# Patient Record
Sex: FEMALE | Race: WHITE | NOT HISPANIC OR LATINO | Employment: FULL TIME | URBAN - METROPOLITAN AREA
[De-identification: names, ages, dates, MRNs, and addresses within clinical notes are randomized per-mention and may not be internally consistent; named-entity substitution may affect disease eponyms.]

---

## 2017-06-19 ENCOUNTER — COMMUNICATION - HEALTHEAST (OUTPATIENT)
Dept: FAMILY MEDICINE | Facility: CLINIC | Age: 37
End: 2017-06-19

## 2017-06-25 ENCOUNTER — OFFICE VISIT (OUTPATIENT)
Dept: URGENT CARE | Facility: URGENT CARE | Age: 37
End: 2017-06-25
Payer: COMMERCIAL

## 2017-06-25 VITALS
OXYGEN SATURATION: 98 % | HEART RATE: 61 BPM | WEIGHT: 160 LBS | HEIGHT: 72 IN | SYSTOLIC BLOOD PRESSURE: 92 MMHG | BODY MASS INDEX: 21.67 KG/M2 | TEMPERATURE: 98.4 F | DIASTOLIC BLOOD PRESSURE: 56 MMHG

## 2017-06-25 DIAGNOSIS — S23.41XA RIB SPRAIN, INITIAL ENCOUNTER: Primary | ICD-10-CM

## 2017-06-25 PROCEDURE — 99213 OFFICE O/P EST LOW 20 MIN: CPT | Performed by: FAMILY MEDICINE

## 2017-06-25 RX ORDER — HYDROCODONE BITARTRATE AND ACETAMINOPHEN 5; 325 MG/1; MG/1
1 TABLET ORAL EVERY 4 HOURS PRN
Qty: 15 TABLET | Refills: 0 | Status: SHIPPED | OUTPATIENT
Start: 2017-06-25 | End: 2022-03-30

## 2017-06-25 NOTE — NURSING NOTE
Chief Complaint   Patient presents with     Urgent Care     Back Pain     was dancing this morning and got dizzy, ended up falling and hitting pew. Back pain mid right side.        Initial BP 92/56  Pulse 61  Temp 98.4  F (36.9  C) (Oral)  Ht 6' (1.829 m)  Wt 160 lb (72.6 kg)  SpO2 98%  Breastfeeding? Yes  BMI 21.7 kg/m2 Estimated body mass index is 21.7 kg/(m^2) as calculated from the following:    Height as of this encounter: 6' (1.829 m).    Weight as of this encounter: 160 lb (72.6 kg).  Medication Reconciliation: complete

## 2017-06-25 NOTE — MR AVS SNAPSHOT
"              After Visit Summary   2017    Shirley Carmona    MRN: [id number]           Patient Information     Date Of Birth          1980        Visit Information        Provider Department      2017 2:10 PM Chato Noel MD [facility]        Today's Diagnoses     Rib sprain, initial encounter    -  1       Follow-ups after your visit        Who to contact     If you have questions or need follow up information about today's clinic visit or your schedule please contact [facility] directly at [phone].  Normal or non-critical lab and imaging results will be communicated to you by Weekdonehart, letter or phone within 4 business days after the clinic has received the results. If you do not hear from us within 7 days, please contact the clinic through JustParkt or phone. If you have a critical or abnormal lab result, we will notify you by phone as soon as possible.  Submit refill requests through LABOMAR or call your pharmacy and they will forward the refill request to us. Please allow 3 business days for your refill to be completed.          Additional Information About Your Visit        MyChart Information     LABOMAR lets you send messages to your doctor, view your test results, renew your prescriptions, schedule appointments and more. To sign up, go to [email or web address] . Click on \"Log in\" on the left side of the screen, which will take you to the Welcome page. Then click on \"Sign up Now\" on the right side of the page.     You will be asked to enter the access code listed below, as well as some personal information. Please follow the directions to create your username and password.     Your access code is: [email or web address]  Expires: 2017  3:13 PM     Your access code will  in 90 days. If you need help or a new code, please call your [place] clinic or [phone].        Care EveryWhere ID     This is your Care EveryWhere ID. " This could be used by other organizations to access your [facility] medical records  [id number]        Your Vitals Were     Pulse Temperature Height Pulse Oximetry Breastfeeding? BMI (Body Mass Index)    61 98.4  F (36.9  C) (Oral) 6' (1.829 m) 98% Yes 21.7 kg/m2       Blood Pressure from Last 3 Encounters:   06/25/17 92/56    Weight from Last 3 Encounters:   06/25/17 160 lb (72.6 kg)              Today, you had the following     No orders found for display         Today's Medication Changes          These changes are accurate as of: 6/25/17  3:13 PM.  If you have any questions, ask your nurse or doctor.               Start taking these medicines.        Dose/Directions    HYDROcodone-acetaminophen 5-325 MG per tablet   Commonly known as:  NORCO   Used for:  Rib sprain, initial encounter   Started by:  Chato Noel MD        Dose:  1 tablet   Take 1 tablet by mouth every 4 hours as needed for pain maximum 2 tablet(s) per day   Quantity:  15 tablet   Refills:  0            Where to get your medicines      Some of these will need a paper prescription and others can be bought over the counter.  Ask your nurse if you have questions.     Bring a paper prescription for each of these medications     HYDROcodone-acetaminophen 5-325 MG per tablet                Primary Care Provider Office Phone # Fax #    Haylee Charlton [phone]       [address] [address]        Equal Access to Services     CARMEN WALKER AH: Hadclaudine mauricio Somitzi, waaxda luqadaha, qaybta kaalmada enedelia, bhavana franks. So [facility] [phone].    ATENCIÓN: Si habla español, tiene a flynn disposición servicios gratuitos de asistencia lingüística. Graeme al [phone].    We comply with applicable federal civil rights laws and [place] laws. We do not discriminate on the basis of race, color, national origin, age, disability sex, sexual orientation or gender identity.             Thank you!     Thank you for choosing [facility]  for your care. Our goal is always to provide you with excellent care. Hearing back from our patients is one way we can continue to improve our services. Please take a few minutes to complete the written survey that you may receive in the mail after your visit with us. Thank you!             Your Updated Medication List - Protect others around you: Learn how to safely use, store and throw away your medicines at www.disposemymeds.org.          This list is accurate as of: 6/25/17  3:13 PM.  Always use your most recent med list.                   Brand Name Dispense Instructions for use Diagnosis    HYDROcodone-acetaminophen 5-325 MG per tablet    NORCO    15 tablet    Take 1 tablet by mouth every 4 hours as needed for pain maximum 2 tablet(s) per day    Rib sprain, initial encounter

## 2017-06-25 NOTE — PROGRESS NOTES
Subjective: A few hours ago patient was dancing, whirled around, suddenly felt very dizzy and fell backwards into a pew, hitting her right middle back area on the seat area. It hurt right away, hasn't taken anything for it yet, is nursing, participated deep breath then moved in certain directions. She has urinated since this happened and it looked normal.    Objective: There is painful posterior thoracic area to palpation, no step-offs are felt in the bones which are easily palpable, lungs are clear, abdomen benign. It's difficult for her to lie down and sit up.    Assessment and plan: Right lateral rib sprain versus fracture, x-ray wouldn't be helpful. This will simply take time. Tylenol and ibuprofen are fine to take when nursing. At nighttime if she can't get comfortable she could take Vicodin and I gave her a prescription for 15 pills. She understands that it would pass through the breast milk and that she should not use Vicodin when caring for her infant.

## 2017-08-02 ENCOUNTER — OFFICE VISIT - HEALTHEAST (OUTPATIENT)
Dept: FAMILY MEDICINE | Facility: CLINIC | Age: 37
End: 2017-08-02

## 2017-08-02 DIAGNOSIS — A60.00 GENITAL HERPES SIMPLEX, UNSPECIFIED SITE: ICD-10-CM

## 2017-08-02 DIAGNOSIS — N91.2 AMENORRHEA: ICD-10-CM

## 2017-08-02 DIAGNOSIS — Z00.00 ROUTINE GENERAL MEDICAL EXAMINATION AT A HEALTH CARE FACILITY: ICD-10-CM

## 2017-08-02 DIAGNOSIS — Z98.891 HISTORY OF LOW TRANSVERSE CESAREAN SECTION: ICD-10-CM

## 2017-08-02 ASSESSMENT — MIFFLIN-ST. JEOR: SCORE: 1504.36

## 2017-08-08 LAB
HPV INTERPRETATION - HISTORICAL: NORMAL
HPV INTERPRETER - HISTORICAL: NORMAL

## 2017-08-11 LAB

## 2017-10-07 ENCOUNTER — COMMUNICATION - HEALTHEAST (OUTPATIENT)
Dept: SCHEDULING | Facility: CLINIC | Age: 37
End: 2017-10-07

## 2018-08-20 ENCOUNTER — COMMUNICATION - HEALTHEAST (OUTPATIENT)
Dept: FAMILY MEDICINE | Facility: CLINIC | Age: 38
End: 2018-08-20

## 2018-08-22 ENCOUNTER — OFFICE VISIT - HEALTHEAST (OUTPATIENT)
Dept: FAMILY MEDICINE | Facility: CLINIC | Age: 38
End: 2018-08-22

## 2018-08-22 DIAGNOSIS — R55 PRE-SYNCOPE: ICD-10-CM

## 2018-08-22 DIAGNOSIS — R19.7 DIARRHEA OF PRESUMED INFECTIOUS ORIGIN: ICD-10-CM

## 2018-08-22 LAB
ANION GAP SERPL CALCULATED.3IONS-SCNC: 8 MMOL/L (ref 5–18)
BASOPHILS # BLD AUTO: 0 THOU/UL (ref 0–0.2)
BASOPHILS NFR BLD AUTO: 1 % (ref 0–2)
BUN SERPL-MCNC: 8 MG/DL (ref 8–22)
CALCIUM SERPL-MCNC: 9.3 MG/DL (ref 8.5–10.5)
CHLORIDE BLD-SCNC: 107 MMOL/L (ref 98–107)
CO2 SERPL-SCNC: 26 MMOL/L (ref 22–31)
CREAT SERPL-MCNC: 0.71 MG/DL (ref 0.6–1.1)
EOSINOPHIL # BLD AUTO: 0.1 THOU/UL (ref 0–0.4)
EOSINOPHIL NFR BLD AUTO: 3 % (ref 0–6)
ERYTHROCYTE [DISTWIDTH] IN BLOOD BY AUTOMATED COUNT: 11.9 % (ref 11–14.5)
GFR SERPL CREATININE-BSD FRML MDRD: >60 ML/MIN/1.73M2
GLUCOSE BLD-MCNC: 100 MG/DL (ref 70–125)
HCT VFR BLD AUTO: 38.7 % (ref 35–47)
HGB BLD-MCNC: 13.1 G/DL (ref 12–16)
LYMPHOCYTES # BLD AUTO: 0.9 THOU/UL (ref 0.8–4.4)
LYMPHOCYTES NFR BLD AUTO: 26 % (ref 20–40)
MCH RBC QN AUTO: 31.2 PG (ref 27–34)
MCHC RBC AUTO-ENTMCNC: 33.8 G/DL (ref 32–36)
MCV RBC AUTO: 92 FL (ref 80–100)
MONOCYTES # BLD AUTO: 0.4 THOU/UL (ref 0–0.9)
MONOCYTES NFR BLD AUTO: 11 % (ref 2–10)
NEUTROPHILS # BLD AUTO: 2 THOU/UL (ref 2–7.7)
NEUTROPHILS NFR BLD AUTO: 59 % (ref 50–70)
PLATELET # BLD AUTO: 224 THOU/UL (ref 140–440)
PMV BLD AUTO: 8.2 FL (ref 7–10)
POTASSIUM BLD-SCNC: 4 MMOL/L (ref 3.5–5)
RBC # BLD AUTO: 4.2 MILL/UL (ref 3.8–5.4)
SODIUM SERPL-SCNC: 141 MMOL/L (ref 136–145)
TSH SERPL DL<=0.005 MIU/L-ACNC: 1.13 UIU/ML (ref 0.3–5)
WBC: 3.5 THOU/UL (ref 4–11)

## 2018-08-23 ENCOUNTER — COMMUNICATION - HEALTHEAST (OUTPATIENT)
Dept: FAMILY MEDICINE | Facility: CLINIC | Age: 38
End: 2018-08-23

## 2018-10-14 ENCOUNTER — COMMUNICATION - HEALTHEAST (OUTPATIENT)
Dept: FAMILY MEDICINE | Facility: CLINIC | Age: 38
End: 2018-10-14

## 2018-10-22 ENCOUNTER — COMMUNICATION - HEALTHEAST (OUTPATIENT)
Dept: FAMILY MEDICINE | Facility: CLINIC | Age: 38
End: 2018-10-22

## 2018-10-29 ENCOUNTER — COMMUNICATION - HEALTHEAST (OUTPATIENT)
Dept: FAMILY MEDICINE | Facility: CLINIC | Age: 38
End: 2018-10-29

## 2018-10-29 DIAGNOSIS — A60.04 HERPES SIMPLEX VULVOVAGINITIS: ICD-10-CM

## 2018-11-01 ENCOUNTER — OFFICE VISIT - HEALTHEAST (OUTPATIENT)
Dept: FAMILY MEDICINE | Facility: CLINIC | Age: 38
End: 2018-11-01

## 2018-11-01 DIAGNOSIS — Z00.00 ROUTINE GENERAL MEDICAL EXAMINATION AT A HEALTH CARE FACILITY: ICD-10-CM

## 2018-11-01 ASSESSMENT — MIFFLIN-ST. JEOR: SCORE: 1461.83

## 2018-11-02 LAB
HPV SOURCE: NORMAL
HUMAN PAPILLOMA VIRUS 16 DNA: NEGATIVE
HUMAN PAPILLOMA VIRUS 18 DNA: NEGATIVE
HUMAN PAPILLOMA VIRUS FINAL DIAGNOSIS: NORMAL
HUMAN PAPILLOMA VIRUS OTHER HR: NEGATIVE
SPECIMEN DESCRIPTION: NORMAL

## 2018-11-12 LAB
BKR LAB AP ABNORMAL BLEEDING: YES
BKR LAB AP BIRTH CONTROL/HORMONES: NORMAL
BKR LAB AP CERVICAL APPEARANCE: NORMAL
BKR LAB AP GYN ADEQUACY: NORMAL
BKR LAB AP GYN INTERPRETATION: NORMAL
BKR LAB AP HPV REFLEX: NORMAL
BKR LAB AP LMP: NORMAL
BKR LAB AP PATIENT STATUS: NORMAL
BKR LAB AP PREVIOUS ABNORMAL: NO
BKR LAB AP PREVIOUS NORMAL: 2013
HIGH RISK?: YES
PATH REPORT.COMMENTS IMP SPEC: NORMAL
RESULT FLAG (HE HISTORICAL CONVERSION): NORMAL

## 2018-12-10 ENCOUNTER — COMMUNICATION - HEALTHEAST (OUTPATIENT)
Dept: FAMILY MEDICINE | Facility: CLINIC | Age: 38
End: 2018-12-10

## 2018-12-10 DIAGNOSIS — A60.00 GENITAL HERPES SIMPLEX, UNSPECIFIED SITE: ICD-10-CM

## 2019-04-08 ENCOUNTER — OFFICE VISIT - HEALTHEAST (OUTPATIENT)
Dept: FAMILY MEDICINE | Facility: CLINIC | Age: 39
End: 2019-04-08

## 2019-04-08 DIAGNOSIS — N39.0 ACUTE UTI (URINARY TRACT INFECTION): ICD-10-CM

## 2019-04-09 ENCOUNTER — COMMUNICATION - HEALTHEAST (OUTPATIENT)
Dept: FAMILY MEDICINE | Facility: CLINIC | Age: 39
End: 2019-04-09

## 2019-04-10 ENCOUNTER — OFFICE VISIT (OUTPATIENT)
Dept: URGENT CARE | Facility: URGENT CARE | Age: 39
End: 2019-04-10
Payer: COMMERCIAL

## 2019-04-10 VITALS
TEMPERATURE: 98.2 F | HEIGHT: 72 IN | WEIGHT: 150 LBS | BODY MASS INDEX: 20.32 KG/M2 | DIASTOLIC BLOOD PRESSURE: 60 MMHG | SYSTOLIC BLOOD PRESSURE: 106 MMHG | HEART RATE: 80 BPM

## 2019-04-10 DIAGNOSIS — R30.0 DYSURIA: ICD-10-CM

## 2019-04-10 DIAGNOSIS — N30.00 ACUTE CYSTITIS WITHOUT HEMATURIA: Primary | ICD-10-CM

## 2019-04-10 LAB
ALBUMIN UR-MCNC: NEGATIVE MG/DL
APPEARANCE UR: CLEAR
BILIRUB UR QL STRIP: NEGATIVE
COLOR UR AUTO: YELLOW
GLUCOSE UR STRIP-MCNC: NEGATIVE MG/DL
HGB UR QL STRIP: NEGATIVE
KETONES UR STRIP-MCNC: NEGATIVE MG/DL
LEUKOCYTE ESTERASE UR QL STRIP: NEGATIVE
NITRATE UR QL: NEGATIVE
PH UR STRIP: 7 PH (ref 5–7)
SOURCE: NORMAL
SP GR UR STRIP: 1.02 (ref 1–1.03)
UROBILINOGEN UR STRIP-ACNC: 0.2 EU/DL (ref 0.2–1)

## 2019-04-10 PROCEDURE — 87086 URINE CULTURE/COLONY COUNT: CPT | Performed by: INTERNAL MEDICINE

## 2019-04-10 PROCEDURE — 81003 URINALYSIS AUTO W/O SCOPE: CPT | Performed by: INTERNAL MEDICINE

## 2019-04-10 PROCEDURE — 99213 OFFICE O/P EST LOW 20 MIN: CPT | Performed by: INTERNAL MEDICINE

## 2019-04-10 RX ORDER — CEFDINIR 300 MG/1
300 CAPSULE ORAL 2 TIMES DAILY
Qty: 14 CAPSULE | Refills: 0 | Status: SHIPPED | OUTPATIENT
Start: 2019-04-10 | End: 2019-04-17

## 2019-04-10 ASSESSMENT — MIFFLIN-ST. JEOR: SCORE: 1472.4

## 2019-04-11 LAB
BACTERIA SPEC CULT: NO GROWTH
SPECIMEN SOURCE: NORMAL

## 2019-04-11 NOTE — PROGRESS NOTES
SUBJECTIVE:   Shirley Carmona is a 39 year old female presenting with a chief complaint of   Chief Complaint   Patient presents with     Urgent Care     UTI     burning and urgency, frequence with urination over 1 week. Was put on Macrobid on Monday but not getting better.        She is an established patient of [facility].    UTI    Onset of symptoms was 1week(s).  Course of illness is same    Current and associated symptoms dysuria, frequency and urgency  Treatment and measures tried placed on macrobid by e-visit by primary  Predisposing factors include history of frequent UTI's  Feels like her past UTIs  Patient denies flank pain, temperature > 101 degrees F., vomiting, vaginal discharge, vaginal odor and vaginal itching            Review of Systems    No past medical history on file.  No family history on file.  Current Outpatient Medications   Medication Sig Dispense Refill     cefdinir (OMNICEF) 300 MG capsule Take 1 capsule (300 mg) by mouth 2 times daily for 7 days 14 capsule 0     Nitrofurantoin Monohyd Macro (MACROBID PO)        HYDROcodone-acetaminophen (NORCO) 5-325 MG per tablet Take 1 tablet by mouth every 4 hours as needed for pain maximum 2 tablet(s) per day 15 tablet 0     Social History     Tobacco Use     Smoking status: Never Smoker   Substance Use Topics     Alcohol use: Not on file       OBJECTIVE  /60   Pulse 80   Temp 98.2  F (36.8  C) (Oral)   Ht 1.829 m (6')   Wt 68 kg (150 lb)   LMP 03/27/2019   Breastfeeding? Yes   BMI 20.34 kg/m      Physical Exam   Abdominal: Soft. There is no tenderness.   Musculoskeletal:   No CVA tenderness   Vitals reviewed.      Labs:  Results for orders placed or performed in visit on 04/10/19 (from the past 24 hour(s))   *UA reflex to Microscopic and Culture ([facility] (except [place] and [facility])   Result Value Ref Range    Color Urine Yellow     Appearance Urine Clear     Glucose Urine Negative NEG^Negative mg/dL     Bilirubin Urine Negative NEG^Negative    Ketones Urine Negative NEG^Negative mg/dL    Specific Gravity Urine 1.020 1.003 - 1.035    Blood Urine Negative NEG^Negative    pH Urine 7.0 5.0 - 7.0 pH    Protein Albumin Urine Negative NEG^Negative mg/dL    Urobilinogen Urine 0.2 0.2 - 1.0 EU/dL    Nitrite Urine Negative NEG^Negative    Leukocyte Esterase Urine Negative NEG^Negative    Source Midstream Urine        ASSESSMENT:      ICD-10-CM    1. Acute cystitis without hematuria N30.00 Urine Culture Aerobic Bacterial     cefdinir (OMNICEF) 300 MG capsule   2. Dysuria R30.0 *UA reflex to Microscopic and Culture ([facility] (except [place] and [place])     Urine Culture Aerobic Bacterial        PLAN:  Discussed if she still has symptoms on Omnicef antibiotics she may be best going off antibiotics for a few days then repeating her urine with her primary      Patient Instructions     Urine test appear normal but most likely partially treated by macrobid  Stop macrobid    Will send urine culture   May grow out negative as on antibiotics     Will start omnicef antibiotics 2 x day 1 week  Probiotics    Call or return to clinic if symptoms worsen or fail to improve as anticipated.

## 2019-04-11 NOTE — PATIENT INSTRUCTIONS
Urine test appear normal but most likely partially treated by macrobid  Stop macrobid    Will send urine culture   May grow out negative as on antibiotics     Will start omnicef antibiotics 2 x day 1 week  Probiotics    Call or return to clinic if symptoms worsen or fail to improve as anticipated.

## 2019-05-04 ENCOUNTER — COMMUNICATION - HEALTHEAST (OUTPATIENT)
Dept: FAMILY MEDICINE | Facility: CLINIC | Age: 39
End: 2019-05-04

## 2019-05-04 DIAGNOSIS — A60.00 GENITAL HERPES SIMPLEX, UNSPECIFIED SITE: ICD-10-CM

## 2019-09-26 ENCOUNTER — RECORDS - HEALTHEAST (OUTPATIENT)
Dept: ADMINISTRATIVE | Facility: OTHER | Age: 39
End: 2019-09-26

## 2019-11-05 ENCOUNTER — COMMUNICATION - HEALTHEAST (OUTPATIENT)
Dept: FAMILY MEDICINE | Facility: CLINIC | Age: 39
End: 2019-11-05

## 2019-11-07 ENCOUNTER — COMMUNICATION - HEALTHEAST (OUTPATIENT)
Dept: SCHEDULING | Facility: CLINIC | Age: 39
End: 2019-11-07

## 2019-11-07 ENCOUNTER — OFFICE VISIT - HEALTHEAST (OUTPATIENT)
Dept: FAMILY MEDICINE | Facility: CLINIC | Age: 39
End: 2019-11-07

## 2019-11-07 DIAGNOSIS — Z00.00 ROUTINE GENERAL MEDICAL EXAMINATION AT A HEALTH CARE FACILITY: ICD-10-CM

## 2019-11-07 DIAGNOSIS — A60.00 GENITAL HERPES SIMPLEX, UNSPECIFIED SITE: ICD-10-CM

## 2019-11-07 DIAGNOSIS — Z13.1 SCREENING FOR DIABETES MELLITUS: ICD-10-CM

## 2019-11-07 DIAGNOSIS — Z13.220 LIPID SCREENING: ICD-10-CM

## 2019-11-07 LAB
CHOLEST SERPL-MCNC: 171 MG/DL
FASTING STATUS PATIENT QL REPORTED: NORMAL
FASTING STATUS PATIENT QL REPORTED: NORMAL
GLUCOSE BLD-MCNC: 76 MG/DL (ref 74–125)
HDLC SERPL-MCNC: 74 MG/DL
LDLC SERPL CALC-MCNC: 84 MG/DL
TRIGL SERPL-MCNC: 65 MG/DL

## 2019-11-07 ASSESSMENT — MIFFLIN-ST. JEOR: SCORE: 1489.05

## 2020-10-28 ENCOUNTER — COMMUNICATION - HEALTHEAST (OUTPATIENT)
Dept: FAMILY MEDICINE | Facility: CLINIC | Age: 40
End: 2020-10-28

## 2020-10-28 DIAGNOSIS — A60.00 GENITAL HERPES SIMPLEX, UNSPECIFIED SITE: ICD-10-CM

## 2020-11-04 ENCOUNTER — OFFICE VISIT - HEALTHEAST (OUTPATIENT)
Dept: FAMILY MEDICINE | Facility: CLINIC | Age: 40
End: 2020-11-04

## 2020-11-04 DIAGNOSIS — F32.81 PMDD (PREMENSTRUAL DYSPHORIC DISORDER): ICD-10-CM

## 2020-11-04 ASSESSMENT — PATIENT HEALTH QUESTIONNAIRE - PHQ9: SUM OF ALL RESPONSES TO PHQ QUESTIONS 1-9: 2

## 2021-05-27 ASSESSMENT — PATIENT HEALTH QUESTIONNAIRE - PHQ9: SUM OF ALL RESPONSES TO PHQ QUESTIONS 1-9: 2

## 2021-05-28 NOTE — TELEPHONE ENCOUNTER
Called [facility] in [facility] at [phone] (spoke with Tonya). Valtrex was last sent on 12/10/18 #90 with 3 refills. Pt should have plenty.  Tonya states that they were filling an old prescription. They will get it ready for pt.

## 2021-05-31 VITALS — HEIGHT: 72 IN | BODY MASS INDEX: 21.33 KG/M2 | WEIGHT: 157.5 LBS

## 2021-06-01 VITALS — BODY MASS INDEX: 20.47 KG/M2 | WEIGHT: 153 LBS

## 2021-06-02 VITALS — BODY MASS INDEX: 20.18 KG/M2 | WEIGHT: 149 LBS | HEIGHT: 72 IN

## 2021-06-03 VITALS
DIASTOLIC BLOOD PRESSURE: 70 MMHG | SYSTOLIC BLOOD PRESSURE: 104 MMHG | WEIGHT: 155 LBS | BODY MASS INDEX: 20.99 KG/M2 | HEART RATE: 73 BPM | OXYGEN SATURATION: 100 % | HEIGHT: 72 IN

## 2021-06-03 NOTE — PROGRESS NOTES
FEMALE ADULT PREVENTIVE EXAM    CHIEF COMPLAINT:  Female preventive exam.    SUBJECTIVE:  Shirley Carmona is a 39 y.o. adult who presents for her routine physical exam.    Patient would like to address the following concerns today:     PMS: worsened since Jose's birth.  Has fatigue - almost flu like.  Needs to sleep for 12 hours.  Emotions are harder.  More irritable, more sensitive.  Feels like she is going to start crying.  More impatient with her child - which she dislikes.  Feels like she snaps a lot.  Feeling down.  Starts shortly after ovulation.  Severe for 4-5 days before menses.  Mild back pain.  Had fluoxetine in [place], but had a bad reaction to it.  Had tried acupuncture in the past for PMS.  We did discuss options for PMS including oral birth control versus SSRI.  At this time, patient is okay with continue to monitor and using more natural methods.  She will let me know if she would like to proceed with either of the options for treatment.    Valcyclovir due to frequent outbreaks.  Had perhaps one outbreak, but it was very minor.         GYNE HISTORY  Menses: regular  Sexually Active: with relationship x 1 year.  Both have kids.  Contraception: primarily condoms.  Last Pap: 2018 - normal.    Abnormal Pap: none  Vaginal Discharge: no      She  has a past medical history of Preeclampsia ().    Lab Results   Component Value Date    WBC 3.5 (L) 2018    HGB 13.1 2018    HCT 38.7 2018    MCV 92 2018     2018     2018    K 4.0 2018    BUN 8 2018     Lab Results   Component Value Date    CHOL 171 2019    HDL 74 2019    LDLCALC 84 2019    TRIG 65 2019     Lab Results   Component Value Date    TSH 1.13 2018     BP Readings from Last 3 Encounters:   19 104/70   18 108/58   18 94/60       Surgeries:    Past Surgical History:   Procedure Laterality Date      SECTION  2015    Son     UT  REMOVAL OF TONSILS,<13 Y/O      Description: Tonsillectomy;  Recorded: 09/23/2010;  Comments: 1995       Family History:  Her family history includes Hypertension in her mother; Hypothyroidism in her mother. She was adopted.    Social History:  She  reports that she has never smoked. She has never used smokeless tobacco. She reports current alcohol use. She reports that she does not use drugs.    Medications:    Current Outpatient Medications:      multivitamin with minerals (THERA-M) 9 mg iron-400 mcg Tab tablet, Take 1 tablet by mouth daily., Disp: , Rfl:      omega-3/dha/epa/fish oil (FISH OIL-OMEGA-3 FATTY ACIDS) 300-1,000 mg capsule, Take 2 g by mouth daily., Disp: , Rfl:      valACYclovir (VALTREX) 1000 MG tablet, Take 1 tablet (1,000 mg total) by mouth daily., Disp: 30 tablet, Rfl: 11      Allergies:  No latex allergies.  Allergies   Allergen Reactions     Sulfa (Sulfonamide Antibiotics) Nausea And Vomiting            RISK BEHAVIOR & HEALTH HABITS  Self Breast Exam: discussed.  Regular Exercise: in the summer, getting gym things set up in the basement.  Was biking everyday..  Balanced diet: yes.  Seat Belt Use: yes  Calcium intake/Osteoporosis prevention: Takes vitamin d supplement.  Eats some dairy.    Guns: NO  Sun Screen: YES  Dental Care: YES    REVIEW OF SYSTEMS:  Complete head to toe review of systems is otherwise negative except as above.    OBJECTIVE:  VITAL SIGNS:    Vitals:    11/07/19 1025   BP: 104/70   Pulse: 73   SpO2: 100%     GENERAL:  Patient alert, in no acute distress.  EYES: PERRLA. Extraoccular movements intact, pupils equal, reactive to light and accommodation.  Normal conjunctiva and lids.  Undilated fudoscopic exam normal, including normal size, appearance cup-to-disc ratio.  Normal posterior segments, including no exudates or hemorrhages.  ENT:  Hearing grossly normal.  Normal appearance to ears and nose.  Bilateral TM s, external canals, oropharynx normal. Normal lips, gums and  teeth.  Normal nasal mucosa, septum and turbinates.  NECK:  Supple, without thyromegaly or mass.  RESP:  Clear to auscultation without crackles, wheezes or distress.  Normal respiratory effort.   CV:  Regular rate and rhythm without murmurs, rubs or gallops.  Normal carotid, abdominal aorta, femoral and pedal pulses.  No varicosities or edema.  ABDOMEN:  Soft, non-tender, without hepatosplenomegaly, masses, or hernias.   BREASTS:  Nontender, without masses, nipple discharge, erythema, or axillary adenopathy.    :    External genitalia:  Normal without lesions.  Urethra: Normal appearing  Vagina: Normal without discharge  Cervix: not due.  Uterus:  Nontender, mobile, without masses  Ovaries: Normal without masses  LYMPHATIC: Normal palpation of neck, groin and axilla..  No lymphadenopathy.  No bruising.  NEURO:  CN II-XII intact, motor & sensory function all intact.  DTR and reflexes normal.  PSYCHIATRIC:  Alert & oriented with normal mood and affect.  Good judgment and insight.  SKIN:  Normal inspection and palpation.  MUSCULOSKELETAL: Normal gait and station.  - Spine / Ribs / Pelvis: Normal inspection, ROM, stability and strength: Spine, Head, Neck, Upper and Lower Extremities.    ASSESSMENT & PLAN  Shirley was seen today for annual exam.    Diagnoses and all orders for this visit:    Routine general medical examination at a health care facility  Patient is living a healthy lifestyle.  Family history is reviewed for high risk screening.  Patient declines flu vaccine today.      Genital herpes simplex, unspecified site  -     valACYclovir (VALTREX) 1000 MG tablet; Take 1 tablet (1,000 mg total) by mouth daily.  Patient is wondering if it is okay for her to discontinue the medication.  Given that she is a new relationship, I did discuss that the Valtrex can reduce transmission to her partner.  We did discuss that 40% of HSV transmission occurs without any active lesions.  Patient will decide if she would like to  continue, but will notify her partner about the risks and benefits.    Lipid screening  -     Lipid [place] RANDOM    Screening for diabetes mellitus  -     Glucose

## 2021-06-03 NOTE — TELEPHONE ENCOUNTER
Patient is calling, because she states she has a physical scheduled for today, and is suppose to have blood work done, and she  Is reporting she accidentally , took all of her morning meds along with her fish oil, and now states she is afraid her cholesterol will be elevated.  Patient wants to have her blood work rescheduled at another clinic in [place], and is going to her Physical at [facility] clinic today.  She was advised to ask Dr HORNER at appointment today, how to handle the blood draw.    Patient agreed to POC.    Malinda Stern RN  [facility] Triage/refill nurse

## 2021-06-12 NOTE — PROGRESS NOTES
"Shirley Carmona is a 40 y.o. adult who is being evaluated via a billable telephone visit.      The patient has been notified of following:     \"This telephone visit will be conducted via a call between you and your physician/provider. We have found that certain health care needs can be provided without the need for a physical exam.  This service lets us provide the care you need with a short phone conversation.  If a prescription is necessary we can send it directly to your pharmacy.  If lab work is needed we can place an order for that and you can then stop by our lab to have the test done at a later time.    Telephone visits are billed at different rates depending on your insurance coverage. During this emergency period, for some insurers they may be billed the same as an in-person visit.  Please reach out to your insurance provider with any questions.    If during the course of the call the physician/provider feels a telephone visit is not appropriate, you will not be charged for this service.\"    Patient has given verbal consent to a Telephone visit? Yes    What phone number would you like to be contacted at? [phone]    Patient would like to receive their AVS by AVS Preference: Malia.    Additional provider notes:     HPI: Patient is a 40-year-old female who presents today with concern for premenstrual dysphoric disorder.    We last talked about this at her physical year ago.  Patient has noticed in the past several years that she is more emotional in the weeks leading up to her menses she is more irritable and has a shorter fuse.  She is definitely more tired.  She notes that is particularly bad in 5 days right before her menstrual cycle.  Most of the time, patient is able to be reasonable and problem solve. Her menses is regular, but in the past year she has noticed 2 to 3 days of spotting prior to her actual bleeding.  She notes on her day of her first normal flow, her symptoms improved.  " Occasionally she has heavy bleeds, and on those days she feels more anemic and her energy is lower.  It is hard to quantify the amount of bleeding if she uses a menstrual cup.  She notes on heavy days she sometimes leaks through the cup and she needs to change it before the 8-hour meng.   She notes some difficulty with falling asleep.    Current life situation during coronavirus pandemic:  Patient is working remotely from home.  Son is back in  full-time.  The father of her son is unemployed since he works in [employer].  They are not together.  They are still living together, and trying to finish the attic in order to have more living space.    Past history: Patient was treated with antidepressants when she was living in [facility].  At the time she was initially tried on Prozac, no week she had severe episodes of worsened depression and suicidal ideation.  Back then she was struggling with low energy and oversleeping.  She was treated with Wellbutrin for a year.  She felt like it worked very well for her.    We had talked about the possibility of menstrual regulation with oral contraceptives.  Patient notes that she reacted very poorly to oral contraceptive a very long time ago.  She hated how she felt on it.  Unfortunately I cannot find which ones she had taken.  She also did not react well to NuvaRing    Current Outpatient Medications   Medication Instructions     multivitamin with minerals (THERA-M) 9 mg iron-400 mcg Tab tablet 1 tablet, Oral, DAILY     omega-3/dha/epa/fish oil (FISH OIL-OMEGA-3 FATTY ACIDS) 300-1,000 mg capsule 2 g, Oral, DAILY     valACYclovir (VALTREX) 1,000 mg, Oral, DAILY     Objective     General appearance: NAD  Psych Exam:  Behavior:normal    Mood:low energy   Affect:normal   Eye Contact:unable to assess   Thought Content: normal   Insight:normal    Judgement: normal        Little interest or pleasure in doing things: Not at all  Feeling down, depressed, or hopeless:  Not at all  Trouble falling or staying asleep, or sleeping too much: Several days  Feeling tired or having little energy: Several days  Poor appetite or overeating: Not at all  Feeling bad about yourself - or that you are a failure or have let yourself or your family down: Not at all  Trouble concentrating on things, such as reading the newspaper or watching television: Not at all  Moving or speaking so slowly that other people could have noticed. Or the opposite - being so fidgety or restless that you have been moving around a lot more than usual: Not at all  Thoughts that you would be better off dead, or of hurting yourself in some way: Not at all  PHQ-9 Total Score: 2  If you checked off any problems, how difficult have these problems made it for you to do your work, take care of things at home, or get along with other people?: Somewhat difficult        Assessment/Plan:  Shirley was seen today for premenstrual syndrome.    Diagnoses and all orders for this visit:    PMDD (premenstrual dysphoric disorder)  We discussed options for treatment including starting on Wellbutrin versus low-dose monophasic oral contraceptive versus Lexapro.  I discussed the risks and benefits of each at length.  Patient was a bit despondent at the thought that none of these were completely curative of premenstrual dysphoric disorder, and that stopping the treatment could potentially lead to return to her symptoms.  Patient has initiated a lot of lifestyle changes and has tried a lot of naturopathic options for treatment of her PMS.  I did discuss the option of just trying it for a year to just take a break from the emotionality of it.  Perhaps her symptoms would improve if they are working through the coronavirus pandemic as she had stated that more she had to deal with, the worse symptoms would be.  She will think about this and let me know.    1:24 PM  2:03 PM      Phone call duration:  34 minutes    NENO Catherine,  MD

## 2021-06-16 PROBLEM — Z98.891 HISTORY OF LOW TRANSVERSE CESAREAN SECTION: Chronic | Status: ACTIVE | Noted: 2017-08-02

## 2021-06-17 NOTE — PATIENT INSTRUCTIONS - HE
Patient Instructions by Haylee Charlton MD at 4/8/2019 11:08 AM     Author: Haylee Charlton MD Service: -- Author Type: Physician    Filed: 4/8/2019 11:08 AM Encounter Date: 4/8/2019 Status: Signed    : Haylee Charlton MD (Physician)           Urinary Tract Infections in Women    Urinary tract infections (UTIs) are most often caused by bacteria. These bacteria enter the urinary tract. The bacteria may come from outside the body. Or they may travel from the skin outside the rectum or vagina into the urethra. Female anatomy makes it easy for bacteria from the bowel to enter a womans urinary tract, which is the most common source of UTI. This means women develop UTIs more often than men. Pain in or around the urinary tract is a common UTI symptom. But the only way to know for sure if you have a UTI for the healthcare provider to test your urine. The two tests that may be done are the urinalysis and urine culture.  Types of UTIs    Cystitis. A bladder infection (cystitis) is the most common UTI in women. You may have urgent or frequent urination. You may also have pain, burning when you urinate, and bloody urine.    Urethritis. This is an inflamed urethra, which is the tube that carries urine from the bladder to outside the body. You may have lower stomach or back pain. You may also have urgent or frequent urination.    Pyelonephritis. This is a kidney infection. If not treated, it can be serious and damage your kidneys. In severe cases, you may need to stay in the hospital. You may have a fever and lower back pain.  Medicines to treat a UTI  Most UTIs are treated with antibiotics. These kill the bacteria. The length of time you need to take them depends on the type of infection. It may be as short as 3 days. If you have repeated UTIs, you may need a low-dose antibiotic for several months. Take antibiotics exactly as directed. Dont stop taking them until all of the medicine is gone. If you stop taking the antibiotic too  soon, the infection may not go away. You may also develop a resistance to the antibiotic. This can make it much harder to treat.  Lifestyle changes to treat and prevent UTIs  The lifestyle changes below will help get rid of your UTI. They may also help prevent future UTIs.    Drink plenty of fluids. This includes water, juice, or other caffeine-free drinks. Fluids help flush bacteria out of your body.    Empty your bladder. Always empty your bladder when you feel the urge to urinate. And always urinate before going to sleep. Urine that stays in your bladder can lead to infection. Try to urinate before and after sex as well.    Practice good personal hygiene. Wipe yourself from front to back after using the toilet. This helps keep bacteria from getting into the urethra.    Use condoms during sex. These help prevent UTIs caused by sexually transmitted bacteria. Also don't use spermicides during sex. These can increase the risk for UTIs. Choose other forms of birth control instead. For women who tend to get UTIs after sex, a low-dose of a preventive antibiotic may be used. Be sure to discuss this option with your healthcare provider.    Follow up with your healthcare provider as directed. He or she may test to make sure the infection has cleared. If needed, more treatment may be started.  Date Last Reviewed: 1/1/2017 2000-2017 [employer]. [address] [place] [address], [place] [address]. All rights reserved. This information is not intended as a substitute for professional medical care. Always follow your healthcare professional's instructions.

## 2021-06-20 NOTE — PROGRESS NOTES
History is a 38-year-old female presents today with concern of being lightheaded.    She notes that for the past 1.5 weeks, she would have some episodes of feeling lightheaded and the sensation that she might pass out.  She notes no episodes of palpitations, chest pain, sweating.  She notes no true loss of consciousness.  She has not had any falls.  She notes no difficulties with coordination.    Yesterday, she got diarrhea.  She notes that occurred twice yesterday morning before work.  She was fine while at work.  At the end of the day, however, she had diarrhea every hour.  She notes no blood, no mucus.  This morning, she woke up feeling awful.  She notes that every time she eats, she has 2-3 bowel movements.  She has been working hard at drinking water, juice, and coconut water.  She notes no one else is sick at home.  She did go to a wedding on Saturday, but she notes prior to the wedding she was already feeling bad.    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  She notes that her menses has only come back since March.  They are longer and that they are lasting 6-7 days at a time.  She cycles every 30 3 days.  One day is usually heavier, and since she is using a diva cup, she finds that she is emptying it every 6 hours.  She is still breast-feeding for comfort.  She did note that her menses started on Monday.    As far as food exposures, patient notes that she has a CSA.  She has been eating the lettuce from the CSA.  She did buy precut melena on .    Patient Active Problem List    Diagnosis Date Noted     History of low transverse  section 2017      - Incomplete        Current Outpatient Prescriptions:      multivitamin with minerals (THERA-M) 9 mg iron-400 mcg Tab tablet, Take 1 tablet by mouth daily., Disp: , Rfl:      omega-3/dha/epa/fish oil (FISH OIL-OMEGA-3 FATTY ACIDS) 300-1,000 mg capsule, Take 2 g by mouth daily., Disp:  , Rfl:     Objective     BP 94/60 (Patient Site: Right Arm, Patient Position: Sitting, Cuff Size: Adult Regular)  Pulse 63  Wt 153 lb (69.4 kg)  LMP 08/20/2018  SpO2 99%  Breastfeeding? Yes  BMI 20.47 kg/m2  General appearance: alert, appears stated age and cooperative  Eyes: conjunctivae/corneas clear. PERRL, EOM's intact. Fundi benign.  Ears: normal TM's and external ear canals both ears  Nose: Nares normal. Septum midline. Mucosa normal. No drainage or sinus tenderness.  Throat: lips, mucosa, and tongue normal; teeth and gums normal  Neck: no adenopathy, no carotid bruit, supple, symmetrical, trachea midline and thyroid not enlarged, symmetric, no tenderness/mass/nodules  Lungs: clear to auscultation bilaterally  Heart: regular rate and rhythm, S1, S2 normal, no murmur, click, rub or gallop  Abdomen: soft, hyperactive bowel sounds, no guarding or rebound,negative Jones's sign, no pain at McBurney's point.  Extremities: extremities normal, atraumatic, no cyanosis or edema  Pulses: 2+ and symmetric  Neurologic: Alert and oriented X 3, normal strength and tone. Normal symmetric reflexes. Normal coordination and gait, CNII-XII are intact.      Shirley was seen today for concerns.    Diagnoses and all orders for this visit:    Pre-syncope  -     Basic Metabolic Panel  -     HM1(CBC and Differential)  -     Thyroid Cascade  -     HM1 (CBC with Diff)  Check labs for organic causes.  At this point it is due to unclear etiology.  This may be related to anemia given that she recently started cycling again.  It is unusual that this would be related to viral prodrome given that it occurred 1.5 weeks prior to diarrhea.  Did discuss adequate fluid hydration.    Diarrhea of presumed infectious origin  Likely viral.  We did discuss the need for electrolyte fluid replacement.  Reviewed BRAT diet.  We did discuss concerning symptoms such as fever, blood, or mucus in the stool that would require reevaluation.

## 2021-06-21 NOTE — PROGRESS NOTES
FEMALE ADULT PREVENTIVE EXAM    CHIEF COMPLAINT:  Female preventive exam.    SUBJECTIVE:  Shirley Carmona is a 38 y.o. female who presents for her routine physical exam.    Patient would like to address the following concerns today:     She has  from [place].  Still living together and raising child together.  It is a civil arrangement.  Did a lot of therapy to get to this point.  She started seeing a therapist recently.  This month has been difficult with more genital herpes outbreaks.  I had, through mychart, started her on valtrex.  Recommend taking 1,000 mg daily as she already had 3 outbreaks in little over a month.      Insomnia:  Jose sleeps through the night, but patient falls asleep, wakes at 3-4 am.  Tries guided meditation to go back to sleep.  Will sleep for an hour before she has to wake up again.  Got off of caffeine two months ago.  Does not drink any alcohol.  Would like to try melatonin, but concerned about addiction.  Discussed lavendar essential oil, okay to start melatonin at 3 mg.        GYNE HISTORY  Menses: every 30 days.  10 days is unusually long.  Normally 5 days, 1-2 light days, then 1-2 heavy days, then light days.  This last one, 5 days of darker blood in the middle and the end, and then bright red in the middle.    Sexually Active: with new partner x 1 month.  Contraception: condoms.    Last Pap: 2017- insufficient sample.  Abnormal Pap: no  Vaginal Discharge: none      She  has a past medical history of Preeclampsia (2015).    Lab Results   Component Value Date    WBC 3.5 (L) 08/22/2018    HGB 13.1 08/22/2018    HCT 38.7 08/22/2018    MCV 92 08/22/2018     08/22/2018     08/22/2018    K 4.0 08/22/2018    BUN 8 08/22/2018     Lab Results   Component Value Date    CHOL 148 05/15/2013    HDL 59 05/15/2013    LDLCALC 68 05/15/2013    TRIG 108 05/15/2013     Lab Results   Component Value Date    TSH 1.13 08/22/2018     BP Readings from Last 3 Encounters:   11/01/18  108/58   18 94/60   17 90/64       Surgeries:    Past Surgical History:   Procedure Laterality Date      SECTION  2015    Son     TX REMOVAL OF TONSILS,<13 Y/O      Description: Tonsillectomy;  Recorded: 2010;  Comments:        Family History:  Her family history includes Hypertension in her mother; Hypothyroidism in her mother. She was adopted.    Social History:  She  reports that  has never smoked. she has never used smokeless tobacco. She reports that she drinks alcohol. She reports that she does not use drugs.    Medications:    Current Outpatient Medications:      multivitamin with minerals (THERA-M) 9 mg iron-400 mcg Tab tablet, Take 1 tablet by mouth daily., Disp: , Rfl:      omega-3/dha/epa/fish oil (FISH OIL-OMEGA-3 FATTY ACIDS) 300-1,000 mg capsule, Take 2 g by mouth daily., Disp: , Rfl:      valACYclovir (VALTREX) 1000 MG tablet, Take 1 tablet (1,000 mg total) by mouth 2 (two) times a day., Disp: 6 tablet, Rfl: 1      Allergies:  No latex allergies.  Allergies   Allergen Reactions     Sulfa (Sulfonamide Antibiotics)             RISK BEHAVIOR & HEALTH HABITS  Self Breast Exam: yes  Regular Exercise: no time, during warmer season - bikes and walks everywhere.    Balanced diet: yes  Seat Belt Use: yes  Calcium intake/Osteoporosis prevention: yes with supplement with Vitamin D.  Makes a lot of bone broth.      Guns: NO  Sun Screen: YES  Dental Care: YES    REVIEW OF SYSTEMS:  Complete head to toe review of systems is otherwise negative except as above.    OBJECTIVE:  VITAL SIGNS:    Vitals:    18 1053   BP: 108/58   Pulse: 66   SpO2: 99%     GENERAL:  Patient alert, in no acute distress.  EYES: PERRLA. Extraoccular movements intact, pupils equal, reactive to light and accommodation.  Normal conjunctiva and lids.  Undilated fudoscopic exam normal, including normal size, appearance cup-to-disc ratio.  Normal posterior segments, including no exudates or  hemorrhages.  ENT:  Hearing grossly normal.  Normal appearance to ears and nose.  Bilateral TM s, external canals, oropharynx normal. Normal lips, gums and teeth.  Normal nasal mucosa, septum and turbinates.  NECK:  Supple, without thyromegaly or mass.  RESP:  Clear to auscultation without crackles, wheezes or distress.  Normal respiratory effort.   CV:  Regular rate and rhythm without murmurs, rubs or gallops.  Normal carotid, abdominal aorta, femoral and pedal pulses.  No varicosities or edema.  ABDOMEN:  Soft, non-tender, without hepatosplenomegaly, masses, or hernias.   BREASTS:  Nontender, without masses, nipple discharge, erythema, or axillary adenopathy.    :    External genitalia:  Normal without lesions.  Urethra: Normal appearing  Vagina: Normal without discharge  Cervix: normal  Uterus:  Nontender, mobile, without masses  Ovaries: Normal without masses  LYMPHATIC: Normal palpation of neck, groin and axilla..  No lymphadenopathy.  No bruising.  NEURO:  CN II-XII intact, motor & sensory function all intact.  DTR and reflexes normal.  PSYCHIATRIC:  Alert & oriented with normal mood and affect.  Good judgment and insight.  SKIN:  Normal inspection and palpation.  MUSCULOSKELETAL: Normal gait and station.  - Spine / Ribs / Pelvis: Normal inspection, ROM, stability and strength: Spine, Head, Neck, Upper and Lower Extremities.    ASSESSMENT & PLAN  Shirley was seen today for annual exam.    Diagnoses and all orders for this visit:    Routine general medical examination at a health care facility  -     Gynecologic Cytology (PAP Smear)  -     HPV High Risk DNA Cervical  Patient is doing well in spite of her separation from the father of her child. It is a civil arrangement and they are coparenting effectively.  Reviewed family history for high risk screening.  Vaccines are up to date.  She declines flu vaccine today.

## 2021-06-27 NOTE — PROGRESS NOTES
Progress Notes by Haylee Charlton MD at 4/8/2019 11:08 AM     Author: Haylee Charlton MD Service: -- Author Type: Physician    Filed: 4/8/2019 11:08 AM Encounter Date: 4/8/2019 Status: Signed    : Haylee Charlton MD (Physician)         Assessment:   The encounter diagnosis was Acute UTI (urinary tract infection).     Plan:     Medications Ordered   Medications   ? nitrofurantoin, macrocrystal-monohydrate, (MACROBID) 100 MG capsule     Sig: Take 1 capsule (100 mg total) by mouth 2 (two) times a day for 5 days.     Dispense:  10 capsule     Refill:  0     Patient Instructions       Urinary Tract Infections in Women    Urinary tract infections (UTIs) are most often caused by bacteria. These bacteria enter the urinary tract. The bacteria may come from outside the body. Or they may travel from the skin outside the rectum or vagina into the urethra. Female anatomy makes it easy for bacteria from the bowel to enter a womans urinary tract, which is the most common source of UTI. This means women develop UTIs more often than men. Pain in or around the urinary tract is a common UTI symptom. But the only way to know for sure if you have a UTI for the healthcare provider to test your urine. The two tests that may be done are the urinalysis and urine culture.  Types of UTIs    Cystitis. A bladder infection (cystitis) is the most common UTI in women. You may have urgent or frequent urination. You may also have pain, burning when you urinate, and bloody urine.    Urethritis. This is an inflamed urethra, which is the tube that carries urine from the bladder to outside the body. You may have lower stomach or back pain. You may also have urgent or frequent urination.    Pyelonephritis. This is a kidney infection. If not treated, it can be serious and damage your kidneys. In severe cases, you may need to stay in the hospital. You may have a fever and lower back pain.  Medicines to treat a UTI  Most UTIs are treated with antibiotics.  These kill the bacteria. The length of time you need to take them depends on the type of infection. It may be as short as 3 days. If you have repeated UTIs, you may need a low-dose antibiotic for several months. Take antibiotics exactly as directed. Dont stop taking them until all of the medicine is gone. If you stop taking the antibiotic too soon, the infection may not go away. You may also develop a resistance to the antibiotic. This can make it much harder to treat.  Lifestyle changes to treat and prevent UTIs  The lifestyle changes below will help get rid of your UTI. They may also help prevent future UTIs.    Drink plenty of fluids. This includes water, juice, or other caffeine-free drinks. Fluids help flush bacteria out of your body.    Empty your bladder. Always empty your bladder when you feel the urge to urinate. And always urinate before going to sleep. Urine that stays in your bladder can lead to infection. Try to urinate before and after sex as well.    Practice good personal hygiene. Wipe yourself from front to back after using the toilet. This helps keep bacteria from getting into the urethra.    Use condoms during sex. These help prevent UTIs caused by sexually transmitted bacteria. Also don't use spermicides during sex. These can increase the risk for UTIs. Choose other forms of birth control instead. For women who tend to get UTIs after sex, a low-dose of a preventive antibiotic may be used. Be sure to discuss this option with your healthcare provider.    Follow up with your healthcare provider as directed. He or she may test to make sure the infection has cleared. If needed, more treatment may be started.  Date Last Reviewed: 1/1/2017 2000-2017 [employer]. [address] [place] [address]. All rights reserved. This information is not intended as a substitute for professional medical care. Always follow your healthcare professional's instructions.          Return for  further follow up if needed. Call [phone]) or schedule an appointment via [facility]    Subjective:   Shirley Carmona is a 39 y.o. adult who submitted an eVisit request for evaluation of her Dysuria.  See the questionnaire and message section of encounter report for information related to history of present illness and review of systems.    The following portions of the patient's history were reviewed and updated as appropriate:  She does not have any pertinent problems on file.  She is allergic to sulfa (sulfonamide antibiotics)..     Objective:   No exam performed today, patient submitted as eVisit.

## 2021-07-03 NOTE — ADDENDUM NOTE
Addendum Note by New Charlton MD at 5/6/2019  2:52 PM     Author: New Charlton MD Service: -- Author Type: Physician    Filed: 5/6/2019  2:52 PM Encounter Date: 5/4/2019 Status: Signed    : New Charlton MD (Physician)    Addended by: NEW CHARLTON on: 5/6/2019 02:52 PM        Modules accepted: Orders

## 2021-08-14 ENCOUNTER — HEALTH MAINTENANCE LETTER (OUTPATIENT)
Age: 41
End: 2021-08-14

## 2021-10-09 ENCOUNTER — HEALTH MAINTENANCE LETTER (OUTPATIENT)
Age: 41
End: 2021-10-09

## 2022-02-10 ENCOUNTER — TELEPHONE (OUTPATIENT)
Dept: FAMILY MEDICINE | Facility: CLINIC | Age: 42
End: 2022-02-10
Payer: COMMERCIAL

## 2022-02-10 DIAGNOSIS — A60.00 GENITAL HERPES SIMPLEX, UNSPECIFIED SITE: ICD-10-CM

## 2022-02-10 RX ORDER — VALACYCLOVIR HYDROCHLORIDE 1 G/1
TABLET, FILM COATED ORAL
Qty: 30 TABLET | Refills: 11 | Status: SHIPPED | OUTPATIENT
Start: 2022-02-10 | End: 2023-03-22

## 2022-02-10 NOTE — TELEPHONE ENCOUNTER
Reason for Call:  Medication or medication refill:    Do you use a [facility]?  Name of the pharmacy and phone number for the current request:    [facility] - [address] - [place]    Name of the medication requested:   Valacyclovir 1000mg      Other request: n/a    Can we leave a detailed message on this number? YES    Phone number patient can be reached at: Cell number on file:    Telephone Information:   Mobile [phone]       Best Time: anytime    Call taken on 2/10/2022 at 3:13 PM by Becki Pretty

## 2022-03-30 ENCOUNTER — OFFICE VISIT (OUTPATIENT)
Dept: FAMILY MEDICINE | Facility: CLINIC | Age: 42
End: 2022-03-30
Payer: COMMERCIAL

## 2022-03-30 VITALS
TEMPERATURE: 97.6 F | HEIGHT: 72 IN | RESPIRATION RATE: 16 BRPM | SYSTOLIC BLOOD PRESSURE: 100 MMHG | WEIGHT: 145.38 LBS | HEART RATE: 76 BPM | DIASTOLIC BLOOD PRESSURE: 72 MMHG | BODY MASS INDEX: 19.69 KG/M2

## 2022-03-30 DIAGNOSIS — Z11.3 SCREEN FOR STD (SEXUALLY TRANSMITTED DISEASE): ICD-10-CM

## 2022-03-30 DIAGNOSIS — Z01.419 ENCOUNTER FOR GYNECOLOGICAL EXAMINATION WITHOUT ABNORMAL FINDING: Primary | ICD-10-CM

## 2022-03-30 DIAGNOSIS — G47.00 INSOMNIA, UNSPECIFIED TYPE: ICD-10-CM

## 2022-03-30 LAB — HIV 1+2 AB+HIV1 P24 AG SERPL QL IA: NEGATIVE

## 2022-03-30 PROCEDURE — 87624 HPV HI-RISK TYP POOLED RSLT: CPT | Performed by: FAMILY MEDICINE

## 2022-03-30 PROCEDURE — 87491 CHLMYD TRACH DNA AMP PROBE: CPT | Performed by: FAMILY MEDICINE

## 2022-03-30 PROCEDURE — 87389 HIV-1 AG W/HIV-1&-2 AB AG IA: CPT | Performed by: FAMILY MEDICINE

## 2022-03-30 PROCEDURE — 87591 N.GONORRHOEAE DNA AMP PROB: CPT | Performed by: FAMILY MEDICINE

## 2022-03-30 PROCEDURE — G0123 SCREEN CERV/VAG THIN LAYER: HCPCS | Performed by: FAMILY MEDICINE

## 2022-03-30 PROCEDURE — 86780 TREPONEMA PALLIDUM: CPT | Performed by: FAMILY MEDICINE

## 2022-03-30 PROCEDURE — 36415 COLL VENOUS BLD VENIPUNCTURE: CPT | Performed by: FAMILY MEDICINE

## 2022-03-30 PROCEDURE — 99396 PREV VISIT EST AGE 40-64: CPT | Performed by: FAMILY MEDICINE

## 2022-03-30 PROCEDURE — 86803 HEPATITIS C AB TEST: CPT | Performed by: FAMILY MEDICINE

## 2022-03-30 RX ORDER — TRAZODONE HYDROCHLORIDE 50 MG/1
50 TABLET, FILM COATED ORAL AT BEDTIME
Qty: 30 TABLET | Refills: 1 | Status: SHIPPED | OUTPATIENT
Start: 2022-03-30 | End: 2023-06-07

## 2022-03-30 ASSESSMENT — PATIENT HEALTH QUESTIONNAIRE - PHQ9: SUM OF ALL RESPONSES TO PHQ QUESTIONS 1-9: 8

## 2022-03-30 NOTE — PROGRESS NOTES
SUBJECTIVE:   CC: Shirley Carmona is an 42 year old woman who presents for preventive health visit.     {Split Bill scripting  The purpose of this visit is to discuss your medical history and prevent health problems before you are sick. You may be responsible for a co-pay, coinsurance, or deductible if your visit today includes services such as checking on a sore throat, having an x-ray or lab test, or treating and evaluating a new or existing condition :  Patient has been advised of split billing requirements and indicates understanding: Yes  Healthy Habits:     Getting at least 3 servings of Calcium per day:  Yes    Bi-annual eye exam:  Yes    Dental care twice a year:  Yes    Sleep apnea or symptoms of sleep apnea:  None    Diet:  Gluten-free/reduced    Frequency of exercise:  2-3 days/week    Duration of exercise:  Less than 15 minutes    Taking medications regularly:  Yes    Medication side effects:  None    PHQ-2 Total Score: 2    Additional concerns today:  No    Bikes to work in Spring and Summer.    Cohabitating with father of son.  They have created a separate living space in the [facility].  Working at [employer].    She is 5-10 lbs down from her baseline weight.  Got COVID in the fall and lost a lot of weight.  Was down to 140.  Has some struggle with sleeping through the night.  Never sleeps solidly through the night.  Can be stress related.  Has tried melatonin and magnesium.  Sleeps well for 3-4 hours on melatonin, then up like a light when she takes 3 mg.     Declines COVID vaccine.    Menses: regular.   Sexually Active: just ended a relationship.  Contraception: pull out method.  Last Pap Smear: 2018 normal and negative.  Abnormal Pap: none.      Today's PHQ-2 Score:   PHQ-2 ( 1999 Pfizer) 3/30/2022   Q1: Little interest or pleasure in doing things 1   Q2: Feeling down, depressed or hopeless 1   PHQ-2 Score 2   Q1: Little interest or pleasure in  doing things Several days   Q2: Feeling down, depressed or hopeless Several days   PHQ-2 Score 2       Abuse: Current or Past (Physical, Sexual or Emotional) - No  Do you feel safe in your environment? Yes    Have you ever done Advance Care Planning? (For example, a Health Directive, POLST, or a discussion with a medical provider or your loved ones about your wishes): No, advance care planning information given to patient to review.  Patient declined advance care planning discussion at this time.    Social History     Tobacco Use     Smoking status: Never Smoker     Smokeless tobacco: Never Used   Substance Use Topics     Alcohol use: Yes     Comment: Alcoholic Drinks/day: rarely once a month     If you drink alcohol do you typically have >3 drinks per day or >7 drinks per week? No    Alcohol Use 3/30/2022   Prescreen: >3 drinks/day or >7 drinks/week? No   No flowsheet data found.    Reviewed orders with patient.  Reviewed health maintenance and updated orders accordingly - Yes  Labs reviewed in EPIC    Breast Cancer Screening:    Breast CA Risk Assessment (FHS-7) 3/30/2022   Do you have a family history of breast, colon, or ovarian cancer? No / Unknown         Mammogram Screening - Offered annual screening and updated Health Maintenance for mutual plan based on risk factor consideration    Pertinent mammograms are reviewed under the imaging tab.    History of abnormal Pap smear: NO - age 30-65 PAP every 5 years with negative HPV co-testing recommended  PAP / HPV Latest Ref Rng & Units 11/1/2018 8/2/2017   PAP - Negative for squamous intraepithelial lesion or malignancy  Electronically signed by Sarah Dawn CT (ASCP) on 11/12/2018 at 11:18 AM   Unsatisfactory for evaluation  Electronically signed by Sarah Dawn CT (ASCP) on 8/11/2017 at  8:53 AM     HPV16 NEG Negative -   HPV18 NEG Negative -   HRHPV NEG Negative -     Reviewed and updated as needed this visit by clinical staff   Tobacco  Allergies   "Meds   Med Hx  Surg Hx  Fam Hx  Soc Hx        Reviewed and updated as needed this visit by Provider                 Past Medical History:   Diagnosis Date     Abnormal Pap smear of cervix 2015    05/3/15      Past Surgical History:   Procedure Laterality Date      SECTION  2015    Son     HC REMOVAL OF TONSILS,<11 Y/O      Description: Tonsillectomy;  Recorded: 2010;  Comments:      OB History   No obstetric history on file.       Review of Systems  CONSTITUTIONAL: NEGATIVE for fever, chills, change in weight  INTEGUMENTARU/SKIN: NEGATIVE for worrisome rashes, moles or lesions  EYES: NEGATIVE for vision changes or irritation  ENT: NEGATIVE for ear, mouth and throat problems  RESP: NEGATIVE for significant cough or SOB  BREAST: NEGATIVE for masses, tenderness or discharge  CV: NEGATIVE for chest pain, palpitations or peripheral edema  GI: NEGATIVE for nausea, abdominal pain, heartburn, or change in bowel habits  : NEGATIVE for unusual urinary or vaginal symptoms. Periods are regular.  MUSCULOSKELETAL: NEGATIVE for significant arthralgias or myalgia  NEURO: NEGATIVE for weakness, dizziness or paresthesias  PSYCHIATRIC: NEGATIVE for changes in mood or affect     OBJECTIVE:   /72 (BP Location: Left arm, Patient Position: Sitting, Cuff Size: Adult Regular)   Pulse 76   Temp 97.6  F (36.4  C) (Tympanic)   Resp 16   Ht 1.84 m (6' 0.44\")   Wt 65.9 kg (145 lb 6 oz)   BMI 19.48 kg/m    Physical Exam  GENERAL: healthy, alert and no distress  EYES: Eyes grossly normal to inspection, PERRL and conjunctivae and sclerae normal  HENT: ear canals and TM's normal, nose and mouth without ulcers or lesions  NECK: no adenopathy, no asymmetry, masses, or scars and thyroid normal to palpation  RESP: lungs clear to auscultation - no rales, rhonchi or wheezes  BREAST: normal without masses, tenderness or nipple discharge and no palpable axillary masses or adenopathy  CV: regular rate and " rhythm, normal S1 S2, no S3 or S4, no murmur, click or rub, no peripheral edema and peripheral pulses strong  ABDOMEN: soft, nontender, no hepatosplenomegaly, no masses and bowel sounds normal   (female): normal female external genitalia, normal urethral meatus, vaginal mucosa pink, moist, well rugated, and normal cervix/adnexa/uterus without masses or discharge  MS: no gross musculoskeletal defects noted, no edema  SKIN: no suspicious lesions or rashes  NEURO: Normal strength and tone, mentation intact and speech normal  PSYCH: mentation appears normal, affect normal/bright    Labs reviewed in Epic    ASSESSMENT/PLAN:   Shirley was seen today for physical.    Diagnoses and all orders for this visit:    Encounter for gynecological examination without abnormal finding  -     Gynecologic Cytology (PAP Smear); Future  -     Chlamydia & Gonorrhea by PCR, [facility] - Clinic Collect  -     Gynecologic Cytology (PAP Smear)  -     HPV High Risk Types DNA Cervical    Screen for STD (sexually transmitted disease)  -     HIV Antigen Antibody Combo; Future  -     Hepatitis C antibody; Future  -     Treponema Abs w Reflex to RPR and Titer; Future  -     HIV Antigen Antibody Combo  -     Hepatitis C antibody  -     Treponema Abs w Reflex to RPR and Titer    Insomnia, unspecified type  -     traZODone (DESYREL) 50 MG tablet; Take 1 tablet (50 mg) by mouth At Bedtime  Reviewed options for treatment.  Patient has used a lot of natural methods and supplements without any improvement.  We did discuss the use, potential side effects, as well as expected outcomes of this medication.  Patient to notify if her symptoms worsen or do not improve.    Other orders  -     REVIEW OF HEALTH MAINTENANCE PROTOCOL ORDERS      COUNSELING:  Reviewed preventive health counseling, as reflected in patient instructions       Regular exercise       Healthy diet/nutrition       Contraception       Safe sex practices/STD prevention    Estimated body mass  "index is 19.48 kg/m  as calculated from the following:    Height as of this encounter: 1.84 m (6' 0.44\").    Weight as of this encounter: 65.9 kg (145 lb 6 oz).        She reports that she has never smoked. She has never used smokeless tobacco.      Counseling Resources:  ATP IV Guidelines  Pooled Cohorts Equation Calculator  Breast Cancer Risk Calculator  BRCA-Related Cancer Risk Assessment: FHS-7 Tool  FRAX Risk Assessment  ICSI Preventive Guidelines  Dietary Guidelines for Americans, 2010  USDA's MyPlate  ASA Prophylaxis  Lung CA Screening    Haylee Charlton MD  [facility]  "

## 2022-03-31 LAB
C TRACH DNA SPEC QL PROBE+SIG AMP: NEGATIVE
HCV AB SERPL QL IA: NEGATIVE
N GONORRHOEA DNA SPEC QL NAA+PROBE: NEGATIVE
T PALLIDUM AB SER QL: NONREACTIVE

## 2022-04-01 LAB
HUMAN PAPILLOMA VIRUS 16 DNA: NEGATIVE
HUMAN PAPILLOMA VIRUS 18 DNA: NEGATIVE
HUMAN PAPILLOMA VIRUS FINAL DIAGNOSIS: NORMAL
HUMAN PAPILLOMA VIRUS OTHER HR: NEGATIVE

## 2022-04-06 LAB
BKR LAB AP GYN ADEQUACY: NORMAL
BKR LAB AP GYN INTERPRETATION: NORMAL
BKR LAB AP HPV REFLEX: NORMAL
BKR LAB AP LMP: NORMAL
BKR LAB AP PREVIOUS ABNORMAL: NORMAL
PATH REPORT.COMMENTS IMP SPEC: NORMAL
PATH REPORT.COMMENTS IMP SPEC: NORMAL
PATH REPORT.RELEVANT HX SPEC: NORMAL

## 2022-04-07 PROBLEM — R87.610 ASCUS OF CERVIX WITH NEGATIVE HIGH RISK HPV: Status: RESOLVED | Noted: 2022-04-07 | Resolved: 2022-04-07

## 2022-04-07 PROBLEM — R87.610 ASCUS OF CERVIX WITH NEGATIVE HIGH RISK HPV: Status: ACTIVE | Noted: 2022-04-07

## 2022-09-17 ENCOUNTER — HEALTH MAINTENANCE LETTER (OUTPATIENT)
Age: 42
End: 2022-09-17

## 2023-05-06 ENCOUNTER — HEALTH MAINTENANCE LETTER (OUTPATIENT)
Age: 43
End: 2023-05-06

## 2023-06-07 ENCOUNTER — OFFICE VISIT (OUTPATIENT)
Dept: FAMILY MEDICINE | Facility: CLINIC | Age: 43
End: 2023-06-07
Payer: COMMERCIAL

## 2023-06-07 VITALS
HEIGHT: 72 IN | DIASTOLIC BLOOD PRESSURE: 62 MMHG | SYSTOLIC BLOOD PRESSURE: 101 MMHG | TEMPERATURE: 98 F | HEART RATE: 74 BPM | OXYGEN SATURATION: 99 % | BODY MASS INDEX: 20.32 KG/M2 | RESPIRATION RATE: 18 BRPM | WEIGHT: 150 LBS

## 2023-06-07 DIAGNOSIS — G47.00 INSOMNIA, UNSPECIFIED TYPE: ICD-10-CM

## 2023-06-07 DIAGNOSIS — N94.3 PREMENSTRUAL SYNDROME: ICD-10-CM

## 2023-06-07 DIAGNOSIS — A60.00 GENITAL HERPES SIMPLEX, UNSPECIFIED SITE: ICD-10-CM

## 2023-06-07 DIAGNOSIS — Z00.00 ROUTINE GENERAL MEDICAL EXAMINATION AT A HEALTH CARE FACILITY: Primary | ICD-10-CM

## 2023-06-07 PROCEDURE — 99396 PREV VISIT EST AGE 40-64: CPT | Performed by: FAMILY MEDICINE

## 2023-06-07 PROCEDURE — 99213 OFFICE O/P EST LOW 20 MIN: CPT | Mod: 25 | Performed by: FAMILY MEDICINE

## 2023-06-07 RX ORDER — TRAZODONE HYDROCHLORIDE 50 MG/1
50 TABLET, FILM COATED ORAL AT BEDTIME
Qty: 90 TABLET | Refills: 3 | Status: SHIPPED | OUTPATIENT
Start: 2023-06-07

## 2023-06-07 RX ORDER — VALACYCLOVIR HYDROCHLORIDE 1 G/1
TABLET, FILM COATED ORAL
Qty: 90 TABLET | Refills: 3 | Status: SHIPPED | OUTPATIENT
Start: 2023-06-07 | End: 2023-06-09

## 2023-06-07 RX ORDER — BUSPIRONE HYDROCHLORIDE 10 MG/1
TABLET ORAL
Qty: 180 TABLET | Refills: 1 | Status: SHIPPED | OUTPATIENT
Start: 2023-06-07

## 2023-06-07 NOTE — PROGRESS NOTES
Assessment & Plan   Routine general medical examination at a health care facility  Doing well.  - TSH with free T4 reflex  - CBC with platelets and differential  - Comprehensive metabolic panel (BMP + Alb, Alk Phos, ALT, AST, Total. Bili, TP)  - Lipid panel reflex to direct LDL Fasting  - Hepatitis B Surface Antibody    Insomnia, unspecified type  She has not been using this. Her waking is often due to night sweats during PMS.  She keeps her room cold in the winter and cool in the summer  - traZODone (DESYREL) 50 MG tablet  Dispense: 90 tablet; Refill: 3    Premenstrual syndrome  This is not a depression reaction, but more anxiety/irritability  Hopefully, improved sleep will help.  Consider a hormone based IUD.  - busPIRone (BUSPAR) 10 MG tablet  Dispense: 180 tablet; Refill: 1    Genital herpes simplex, unspecified site  Has outbreaks if she misses doses. Recommended increasing to twice a day a day after missing.  - valACYclovir (VALTREX) 1000 mg tablet  Dispense: 90 tablet; Refill: 3  KULDEEP AVALOS MD  [facility]  Subjective   Kuldeep is a 43 year old, presenting for the following health issues:  Establish Care (New Pt (previous MD was Dr. HORNER)- needs med refills )        6/7/2023     1:21 PM   Additional Questions   Roomed by Sue Camacho for her preventative care.  Takes valacyclovir, outbreaks are once or twice a year. Usually when she isn't consistent in taking it.   PMS is debilitating-- mainly emotional. Periods are still regular. Prozac gave her suicidal ideation several years ago. Fatigue, irritability with anger outbursts. Has a light paranoia and anxiety. Insomnia and night sweats only during the PMS. These affect get parenting. Usual cycle is normally 33 days. She thinks she wakes up do to temperature dysregulation. Has to change bedclothes sometimes.  She has a 8 yo. They are active together. But she doesn't have an exercise routine.   and the son's dad is  engaged.    History of Present Illness       Reason for visit:  Physical exam    She eats 4 or more servings of fruits and vegetables daily.She consumes 0 sweetened beverage(s) daily.She exercises with enough effort to increase her heart rate 30 to 60 minutes per day.  She exercises with enough effort to increase her heart rate 3 or less days per week. She is missing 1 dose(s) of medications per week.       Review of Systems   Constitutional, HEENT, cardiovascular, pulmonary, GI, , musculoskeletal, neuro, skin, endocrine and psych systems are negative, except as otherwise noted.      Objective    /62 (BP Location: Left arm, Patient Position: Sitting, Cuff Size: Adult Regular)   Pulse 74   Temp 98  F (36.7  C) (Tympanic)   Resp 18   Ht 1.829 m (6')   Wt 68 kg (150 lb)   LMP 05/31/2023 (Exact Date)   SpO2 99%   BMI 20.34 kg/m    Body mass index is 20.34 kg/m .  Physical Exam   GENERAL: healthy, alert and no distress  EYES: Eyes grossly normal to inspection, PERRL and conjunctivae and sclerae normal  HENT: ear canals and TM's normal, nose and mouth without ulcers or lesions  NECK: no adenopathy, no asymmetry, masses, or scars and thyroid normal to palpation  RESP: lungs clear to auscultation - no rales, rhonchi or wheezes  CV: regular rate and rhythm, normal S1 S2, no S3 or S4, no murmur, click or rub, no peripheral edema and peripheral pulses strong  ABDOMEN: soft, nontender, no hepatosplenomegaly, no masses and bowel sounds normal  MS: no gross musculoskeletal defects noted, no edema  SKIN: no suspicious lesions or rashes  NEURO: Normal strength and tone, mentation intact and speech normal  PSYCH: mentation appears normal, affect normal/bright    Office Visit on 03/30/2022   Component Date Value Ref Range Status     Chlamydia Trachomatis 03/30/2022 Negative  Negative Final    Negative for C. trachomatis rRNA by transcription mediated amplification.   A negative result by transcription mediated  amplification does not preclude the presence of infection because results are dependent on proper and adequate collection, absence of inhibitors and sufficient rRNA to be detected.     Neisseria gonorrhoeae 03/30/2022 Negative  Negative Final    Negative for N. gonorrhoeae rRNA by transcription mediated amplification. A negative result by transcription mediated amplification does not preclude the presence of C. trachomatis infection because results are dependent on proper and adequate collection, absence of inhibitors and sufficient rRNA to be detected.     HIV Antigen Antibody Combo 03/30/2022 Negative  Negative Final     Hepatitis C Antibody 03/30/2022 Negative  Negative Final     Treponema Antibody Total 03/30/2022 Nonreactive  Nonreactive Final     Interpretation 03/30/2022 Negative for Intraepithelial Lesion or Malignancy (NILM)    Final     Comment 03/30/2022    Final                    Value:This result contains rich text formatting which cannot be displayed here.     Specimen Adequacy 03/30/2022 Satisfactory for evaluation, endocervical/transformation zone component present   Final     Clinical Information 03/30/2022    Final                    Value:This result contains rich text formatting which cannot be displayed here.     LMP/Menopause Date 03/30/2022    Final                    Value:This result contains rich text formatting which cannot be displayed here.     Reflex Testing 03/30/2022 Yes regardless of result   Final     Previous Abnormal? 03/30/2022    Final                    Value:This result contains rich text formatting which cannot be displayed here.     Performing Labs 03/30/2022    Final                    Value:This result contains rich text formatting which cannot be displayed here.     Other HR HPV 03/30/2022 Negative  Negative Final     HPV16 DNA 03/30/2022 Negative  Negative Final     HPV18 DNA 03/30/2022 Negative  Negative Final     FINAL DIAGNOSIS 03/30/2022    Final                     Value:This result contains rich text formatting which cannot be displayed here.

## 2023-06-09 DIAGNOSIS — A60.00 GENITAL HERPES SIMPLEX, UNSPECIFIED SITE: ICD-10-CM

## 2023-06-09 RX ORDER — VALACYCLOVIR HYDROCHLORIDE 1 G/1
TABLET, FILM COATED ORAL
Qty: 90 TABLET | Refills: 3 | Status: SHIPPED | OUTPATIENT
Start: 2023-06-09 | End: 2024-06-27

## 2023-08-04 ENCOUNTER — LAB (OUTPATIENT)
Dept: LAB | Facility: CLINIC | Age: 43
End: 2023-08-04
Payer: COMMERCIAL

## 2023-08-04 DIAGNOSIS — Z00.00 ROUTINE GENERAL MEDICAL EXAMINATION AT A HEALTH CARE FACILITY: ICD-10-CM

## 2023-08-04 LAB
ALBUMIN SERPL BCG-MCNC: 4.1 G/DL (ref 3.5–5.2)
ALP SERPL-CCNC: 38 U/L (ref 35–129)
ALT SERPL W P-5'-P-CCNC: 14 U/L (ref 0–70)
ANION GAP SERPL CALCULATED.3IONS-SCNC: 9 MMOL/L (ref 7–15)
AST SERPL W P-5'-P-CCNC: 23 U/L (ref 0–45)
BASOPHILS # BLD AUTO: 0 10E3/UL (ref 0–0.2)
BASOPHILS NFR BLD AUTO: 1 %
BILIRUB SERPL-MCNC: 0.9 MG/DL
BUN SERPL-MCNC: 13.9 MG/DL (ref 6–20)
CALCIUM SERPL-MCNC: 8.9 MG/DL (ref 8.6–10)
CHLORIDE SERPL-SCNC: 103 MMOL/L (ref 98–107)
CHOLEST SERPL-MCNC: 162 MG/DL
CREAT SERPL-MCNC: 0.67 MG/DL (ref 0.51–1.17)
DEPRECATED HCO3 PLAS-SCNC: 24 MMOL/L (ref 22–29)
EOSINOPHIL # BLD AUTO: 0.2 10E3/UL (ref 0–0.7)
EOSINOPHIL NFR BLD AUTO: 5 %
ERYTHROCYTE [DISTWIDTH] IN BLOOD BY AUTOMATED COUNT: 11.8 % (ref 10–15)
GFR SERPL CREATININE-BSD FRML MDRD: >90 ML/MIN/1.73M2
GLUCOSE SERPL-MCNC: 92 MG/DL (ref 70–99)
HBV SURFACE AB SERPL IA-ACNC: 11.44 M[IU]/ML
HBV SURFACE AB SERPL IA-ACNC: NORMAL M[IU]/ML
HCT VFR BLD AUTO: 40.7 % (ref 35–53)
HDLC SERPL-MCNC: 68 MG/DL
HGB BLD-MCNC: 13.5 G/DL (ref 11.7–17.7)
IMM GRANULOCYTES # BLD: 0 10E3/UL
IMM GRANULOCYTES NFR BLD: 0 %
LDLC SERPL CALC-MCNC: 82 MG/DL
LYMPHOCYTES # BLD AUTO: 1.3 10E3/UL (ref 0.8–5.3)
LYMPHOCYTES NFR BLD AUTO: 45 %
MCH RBC QN AUTO: 32.3 PG (ref 26.5–33)
MCHC RBC AUTO-ENTMCNC: 33.2 G/DL (ref 31.5–36.5)
MCV RBC AUTO: 97 FL (ref 78–100)
MONOCYTES # BLD AUTO: 0.3 10E3/UL (ref 0–1.3)
MONOCYTES NFR BLD AUTO: 11 %
NEUTROPHILS # BLD AUTO: 1.1 10E3/UL (ref 1.6–8.3)
NEUTROPHILS NFR BLD AUTO: 38 %
NONHDLC SERPL-MCNC: 94 MG/DL
PLATELET # BLD AUTO: 233 10E3/UL (ref 150–450)
POTASSIUM SERPL-SCNC: 4.5 MMOL/L (ref 3.4–5.3)
PROT SERPL-MCNC: 6.9 G/DL (ref 6.4–8.3)
RBC # BLD AUTO: 4.18 10E6/UL (ref 3.8–5.9)
SODIUM SERPL-SCNC: 136 MMOL/L (ref 136–145)
TRIGL SERPL-MCNC: 58 MG/DL
TSH SERPL DL<=0.005 MIU/L-ACNC: 2.11 UIU/ML (ref 0.3–4.2)
WBC # BLD AUTO: 2.9 10E3/UL (ref 4–11)

## 2023-08-04 PROCEDURE — 86706 HEP B SURFACE ANTIBODY: CPT

## 2023-08-04 PROCEDURE — 85025 COMPLETE CBC W/AUTO DIFF WBC: CPT

## 2023-08-04 PROCEDURE — 80053 COMPREHEN METABOLIC PANEL: CPT

## 2023-08-04 PROCEDURE — 36415 COLL VENOUS BLD VENIPUNCTURE: CPT

## 2023-08-04 PROCEDURE — 84443 ASSAY THYROID STIM HORMONE: CPT

## 2023-08-04 PROCEDURE — 80061 LIPID PANEL: CPT

## 2024-02-24 ENCOUNTER — HEALTH MAINTENANCE LETTER (OUTPATIENT)
Age: 44
End: 2024-02-24

## 2024-06-27 DIAGNOSIS — A60.00 GENITAL HERPES SIMPLEX, UNSPECIFIED SITE: ICD-10-CM

## 2024-06-27 RX ORDER — VALACYCLOVIR HYDROCHLORIDE 1 G/1
TABLET, FILM COATED ORAL
Qty: 90 TABLET | Refills: 0 | Status: SHIPPED | OUTPATIENT
Start: 2024-06-27

## 2024-07-13 ENCOUNTER — HEALTH MAINTENANCE LETTER (OUTPATIENT)
Age: 44
End: 2024-07-13

## 2025-07-19 ENCOUNTER — HEALTH MAINTENANCE LETTER (OUTPATIENT)
Age: 45
End: 2025-07-19